# Patient Record
Sex: MALE | Race: WHITE | HISPANIC OR LATINO | Employment: FULL TIME | URBAN - METROPOLITAN AREA
[De-identification: names, ages, dates, MRNs, and addresses within clinical notes are randomized per-mention and may not be internally consistent; named-entity substitution may affect disease eponyms.]

---

## 2024-07-15 ENCOUNTER — APPOINTMENT (OUTPATIENT)
Dept: PRIMARY CARE | Facility: CLINIC | Age: 24
End: 2024-07-15

## 2024-07-15 ASSESSMENT — LIFESTYLE VARIABLES: HISTORY_OF_SMOKING: I HAVE NEVER SMOKED

## 2024-10-04 ENCOUNTER — TELEMEDICINE (OUTPATIENT)
Dept: PRIMARY CARE | Facility: CLINIC | Age: 24
End: 2024-10-04
Payer: COMMERCIAL

## 2024-10-04 DIAGNOSIS — J06.9 VIRAL URI: Primary | ICD-10-CM

## 2024-10-04 PROCEDURE — 99213 OFFICE O/P EST LOW 20 MIN: CPT | Performed by: NURSE PRACTITIONER

## 2024-10-04 RX ORDER — BROMPHENIRAMINE MALEATE, PSEUDOEPHEDRINE HYDROCHLORIDE, AND DEXTROMETHORPHAN HYDROBROMIDE 2; 30; 10 MG/5ML; MG/5ML; MG/5ML
5 SYRUP ORAL 4 TIMES DAILY PRN
Qty: 120 ML | Refills: 0 | Status: SHIPPED | OUTPATIENT
Start: 2024-10-04 | End: 2024-10-14

## 2024-10-04 ASSESSMENT — ENCOUNTER SYMPTOMS
VOMITING: 1
SORE THROAT: 1
SINUS PRESSURE: 1
NECK PAIN: 0
FATIGUE: 1
CHILLS: 0
COUGH: 0
DIAPHORESIS: 1
TROUBLE SWALLOWING: 0
APPETITE CHANGE: 0
HEADACHES: 1
DIARRHEA: 1
SINUS PAIN: 0
MYALGIAS: 0
DYSURIA: 0
ACTIVITY CHANGE: 0
DIZZINESS: 0
RHINORRHEA: 1
FEVER: 0
ABDOMINAL PAIN: 0
WHEEZING: 0
CHEST TIGHTNESS: 0
NAUSEA: 1
LIGHT-HEADEDNESS: 0

## 2024-10-04 NOTE — PROGRESS NOTES
Subjective   Patient ID: Vijay Antonio is a 24 y.o. male who presents for URI.    Sx onset: Tuesday  Sx include: sinus pressure, nasal congestion, nausea/vomiting, body aches, sweats, HA, sore throat.    COVID test was negative using OTC medication mucinex sinus relief,     URI   This is a new problem. The current episode started in the past 7 days. There has been no fever. Associated symptoms include congestion, diarrhea, headaches, nausea, rhinorrhea, sneezing, a sore throat and vomiting. Pertinent negatives include no abdominal pain, chest pain, coughing, dysuria, neck pain, plugged ear sensation, sinus pain or wheezing.        Review of Systems   Constitutional:  Positive for diaphoresis and fatigue. Negative for activity change, appetite change, chills and fever.   HENT:  Positive for congestion, rhinorrhea, sinus pressure, sneezing and sore throat. Negative for sinus pain and trouble swallowing.    Respiratory:  Negative for cough, chest tightness and wheezing.    Cardiovascular:  Negative for chest pain.   Gastrointestinal:  Positive for diarrhea, nausea and vomiting. Negative for abdominal pain.   Genitourinary:  Negative for dysuria.   Musculoskeletal:  Negative for myalgias and neck pain.   Neurological:  Positive for headaches. Negative for dizziness and light-headedness.       Objective   There were no vitals taken for this visit.    Physical Exam  Constitutional:       General: He is not in acute distress.     Appearance: Normal appearance. He is obese. He is not ill-appearing.      Comments: On Demand Virtual Visit Patient Consent     An interactive audio and video telecommunication system which permits real time communications between the patient (at the originating site) and provider (at the distant site) was utilized to provide this telehealth service.   Verbal consent was requested and obtained from Vijay Antonio (or parent if under 18) on this date, 03/27/24 for a telehealth  visit.   I have verbally confirmed with Vijay Antonio (or parent if under 18) that they are physically located in the [facility] during this virtual visit.    I performed this visit using realtime telehealth tools, including an audio/video OR telephone connection between the patient listed who was located in the STATE [facility] and myself, Skyler Morgan CNP (licensed in the [facility]).  At the start of the visit, I introduced myself as Skyler Morgan, Nurse practitioner and verified the patients name, , and current physical location.    If they were currently outside of the state of [place], the visit was ended and the patient was referred to alternative means for evaluation and treatment.   The patient was made aware of the limitations of the telehealth visit.  They will not be physically examined and all issues may not be appropriate for a telehealth visit.  If necessary, an in person referral will be made.       DISCLAIMER:   In preparing for this visit and writing this note, I reviewed previous electronic medical records (labs, imaging and medical charts) available.  Significant findings which helped in decision making are recorded in this encounter charting.    Telemedicine appropriate evaluation completed.  Unable to perform complete physical exam due to virtual visit, patient was visualized on interactive video.      Pulmonary:      Effort: Pulmonary effort is normal.   Neurological:      Mental Status: He is alert and oriented to person, place, and time.         Assessment/Plan   Diagnoses and all orders for this visit:  Viral URI  -     brompheniramine-pseudoeph-DM 2-30-10 mg/5 mL syrup; Take 5 mL by mouth 4 times a day as needed for congestion or cough for up to 10 days.  May take OTC robitussin or Mucinex as needed for cough, recommend warm liquids for sore throat, honey 1tsp three times daily may help cough, ad Tylenol as needed for pain/fever.  Rest and drink plenty of fluids     Most upper  respiratory infection are caused by viruses but sometimes they cause secondary bacterial infections. It can cause cough, congestion, runny nose, sore throat, and fever. You are contagious. Fever medicines can help reduce fever and pain. Virus cannot be cured by an antibiotic. The body's immune system will fight off the virus. Upper Respiratory Illnesses usually improves in 7 - 10 days, but coughs can last for several weeks. If your symptoms worsen after 10 - 14 days you may have a bacterial infection.

## 2024-10-04 NOTE — LETTER
October 4, 2024     Patient: Vijay Antonio   YOB: 2000   Date of Visit: 10/4/2024       To Whom It May Concern:    Vijay Antonio was seen in my clinic on 10/4/2024 at 2:40 pm. Please excuse Vijay for his absence from work on 10/03/2024 through 10/04/2024 due to illness.      If you have any questions or concerns, please don't hesitate to call.         Sincerely,         Skyler Morgan, APRN-CNP        CC: No Recipients

## 2024-12-01 ENCOUNTER — TELEMEDICINE (OUTPATIENT)
Dept: PRIMARY CARE | Facility: CLINIC | Age: 24
End: 2024-12-01
Payer: COMMERCIAL

## 2024-12-01 DIAGNOSIS — J06.9 VIRAL URI WITH COUGH: Primary | ICD-10-CM

## 2024-12-01 PROCEDURE — 99213 OFFICE O/P EST LOW 20 MIN: CPT | Performed by: NURSE PRACTITIONER

## 2024-12-01 RX ORDER — BROMPHENIRAMINE MALEATE, PSEUDOEPHEDRINE HYDROCHLORIDE, AND DEXTROMETHORPHAN HYDROBROMIDE 2; 30; 10 MG/5ML; MG/5ML; MG/5ML
5 SYRUP ORAL 4 TIMES DAILY PRN
Qty: 120 ML | Refills: 0 | Status: SHIPPED | OUTPATIENT
Start: 2024-12-01 | End: 2024-12-11

## 2024-12-01 ASSESSMENT — ENCOUNTER SYMPTOMS
CHILLS: 1
SHORTNESS OF BREATH: 0
ABDOMINAL PAIN: 0
FATIGUE: 1
SWOLLEN GLANDS: 0
NAUSEA: 0
SINUS PRESSURE: 1
COUGH: 1
VOMITING: 0
DIARRHEA: 1
ACTIVITY CHANGE: 0
FEVER: 1
DYSURIA: 0
SORE THROAT: 0
NECK PAIN: 0
CHEST TIGHTNESS: 0
DIZZINESS: 0
HEADACHES: 1
SINUS PAIN: 0
RHINORRHEA: 0
LIGHT-HEADEDNESS: 0
WHEEZING: 0
APPETITE CHANGE: 1
DIAPHORESIS: 0

## 2024-12-01 NOTE — LETTER
December 1, 2024     Patient: Vijay Antonio   YOB: 2000   Date of Visit: 12/1/2024       To Whom It May Concern:    Vijay Antonio was seen in my clinic on 12/1/2024 at 2:45 pm. Please excuse Vijay for his absence from work 11/30/2024 through 12/02/2024 due to illness.     If you have any questions or concerns, please don't hesitate to call.         Sincerely,         Skyler Morgan, APRN-CNP        CC: No Recipients

## 2024-12-01 NOTE — PROGRESS NOTES
Subjective   Patient ID: Vijay Antonio is a 24 y.o. male who presents for URI (Sx onset: 3 days ago).    Sx include: head cold sx, nasal/sinus congestion, cough, fever (101 t-max), full body aches, diarrhea  No flu test   COVID was negative yesterday  Denies wheezing, CP  OTC tx: cold medicine      URI   This is a new problem. The current episode started in the past 7 days. The problem has been waxing and waning. The maximum temperature recorded prior to his arrival was 101 - 101.9 F. The fever has been present for 1 to 2 days. Associated symptoms include congestion, coughing, diarrhea and headaches. Pertinent negatives include no abdominal pain, chest pain, dysuria, ear pain, joint pain, joint swelling, nausea, neck pain, plugged ear sensation, rash, rhinorrhea, sinus pain, sneezing, sore throat, swollen glands, vomiting or wheezing. He has tried antihistamine for the symptoms. The treatment provided mild relief.        Review of Systems   Constitutional:  Positive for appetite change, chills, fatigue and fever. Negative for activity change and diaphoresis.   HENT:  Positive for congestion, postnasal drip and sinus pressure. Negative for ear pain, rhinorrhea, sinus pain, sneezing and sore throat.    Respiratory:  Positive for cough. Negative for chest tightness, shortness of breath and wheezing.    Cardiovascular:  Negative for chest pain.   Gastrointestinal:  Positive for diarrhea. Negative for abdominal pain, nausea and vomiting.   Genitourinary:  Negative for dysuria.   Musculoskeletal:  Negative for joint pain and neck pain.   Skin:  Negative for rash.   Neurological:  Positive for headaches. Negative for dizziness and light-headedness.       Objective   There were no vitals taken for this visit.    Physical Exam  Constitutional:       General: He is not in acute distress.     Appearance: Normal appearance. He is not ill-appearing.      Comments: On Demand Virtual Visit Patient Consent     An  interactive audio and video telecommunication system which permits real time communications between the patient (at the originating site) and provider (at the distant site) was utilized to provide this telehealth service.   Verbal consent was requested and obtained from Vijay Antonio (or parent if under 18) on this date, 24 for a telehealth visit.   I have verbally confirmed with Vijay Antonio (or parent if under 18) that they are physically located in the [facility] during this virtual visit.    I performed this visit using realtime telehealth tools, including an audio/video OR telephone connection between the patient listed who was located in the [facility] and myself, Skyler Morgan CNP (licensed in the [facility]).  At the start of the visit, I introduced myself as Skyler Morgan Nurse practitioner and verified the patients name, , and current physical location.    If they were currently outside of the state [facility], the visit was ended and the patient was referred to alternative means for evaluation and treatment.   The patient was made aware of the limitations of the telehealth visit.  They will not be physically examined and all issues may not be appropriate for a telehealth visit.  If necessary, an in person referral will be made.       DISCLAIMER:   In preparing for this visit and writing this note, I reviewed previous electronic medical records (labs, imaging and medical charts) available.  Significant findings which helped in decision making are recorded in this encounter charting.     Pulmonary:      Effort: Pulmonary effort is normal.      Comments: Able to speak in full sentences without difficulty, cough not assess during video visit.     Neurological:      Mental Status: He is alert and oriented to person, place, and time.         Assessment/Plan   Diagnoses and all orders for this visit:  Viral URI with cough  -     brompheniramine-pseudoeph-DM 2-30-10 mg/5 mL syrup;  Take 5 mL by mouth 4 times a day as needed for congestion, cough or allergies for up to 10 days.  Most upper respiratory infection are caused by viruses but sometimes they cause secondary bacterial infections. It can cause cough, congestion, runny nose, sore throat, and fever. You are contagious. Fever medicines can help reduce fever and pain. Virus cannot be cured by an antibiotic. The body's immune system will fight off the virus. Upper Respiratory Illnesses usually improves in 7 - 10 days, but coughs can last for several weeks. If your symptoms worsen after 10 - 14 days you may have a bacterial infection.     Follow up with PCP as needed  Education provided in writing in MyChart

## 2024-12-27 ENCOUNTER — TELEMEDICINE (OUTPATIENT)
Dept: PRIMARY CARE | Facility: CLINIC | Age: 24
End: 2024-12-27
Payer: COMMERCIAL

## 2024-12-27 ENCOUNTER — APPOINTMENT (OUTPATIENT)
Dept: PRIMARY CARE | Facility: CLINIC | Age: 24
End: 2024-12-27
Payer: COMMERCIAL

## 2024-12-27 DIAGNOSIS — J06.9 URI, ACUTE: Primary | ICD-10-CM

## 2024-12-27 PROCEDURE — 99213 OFFICE O/P EST LOW 20 MIN: CPT

## 2024-12-27 ASSESSMENT — ENCOUNTER SYMPTOMS
VOMITING: 0
SORE THROAT: 0
WHEEZING: 0
RHINORRHEA: 1
HEADACHES: 1
NAUSEA: 0
COUGH: 1
ABDOMINAL PAIN: 0

## 2024-12-27 NOTE — LETTER
December 27, 2024     Patient: Vijay Antonio   YOB: 2000   Date of Visit: 12/27/2024       To Whom It May Concern:    Vijay Antonio was seen in my clinic on 12/27/2024 at 5:50 pm. Please excuse Vijay for his absence from work on this day to make the appointment. And from work 12/27/24 and 12/28/24 to recover from his viral illness    If you have any questions or concerns, please don't hesitate to call.         Sincerely,         Tiffanie Henderson, MARK-CNP        CC: No Recipients

## 2024-12-28 NOTE — PATIENT INSTRUCTIONS
Drinking plenty of fluids and getting lots of rest. Chicken soup and hot beverages may help.    Trial of nasal irrigation with a Nettipot or squeeze bottle with sterile salt water.    Nasal spray corticosteroids (Flonase) may help in reducing the inflammatory response in the nasal passages and airways. Please try 2 sprays each nostril daily for 2 weeks.  If you have season allergies, please take a daily antihistamine of your choice, such as Zyrtec/Claritin/Allegra at bedtime.    Take Tylenol or Motrin/Aleve for sinus or ear pain.    If you have good blood pressure you can try pseudofed (you must ask the pharmacist for it and show ID).    Please call or return to the office if you are not feeling better in the next 3-4 days after starting treatment.    Over-the-counter cold and cough medications     Take Mucinex for cough, drink plenty of fluids with this medication and will help break up congestion making it easier to cough up     For cough can use honey (children ages 1 and up) in hot tea or hot water. I recommend putting this in an insulated cup and carrying it around throughout the day to sip on.  Have it at your bedside at night in case you wake up coughing.  You can also use honey cough drops (adults and older children).     Recommend nasal saline for use in children and adults.  Neti Luu can also be helpful.  (Never used tap water and a Neti pot.  Use distilled water.)     If you have plugged up congested ears or the feeling of fluid in your ears, you can use an over-the-counter nasal steroid spray like fluticasone (brand name Flonase) use 2 sprays into each nostril once or twice a day for 7 days.  This will help open up the eustachian tubes and allow the fluid to drain out of your ears.     Sleeping with your head/chest elevated can help with sinus drainage.     Adults only-can use nasal decongestant (like Afrin) at bedtime to open nasal passages so you can breathe through your nose while you sleep; avoid  using for longer than 3 days as this medication can become addicting.  Do not use if you have high blood pressure or high heart rate.     For severe pain or fever in adult-Tylenol (2 extra strength) or ibuprofen (3-4 tabs equals 600 to 800 mg) alternating as needed for pain.  Tylenol doses should be 6 to 8 hours apart and ibuprofen doses should be 6 to 8 hours apart.        Common cold medications for adults explained:     Mucinex-(generic name guaifenesin)-is an expectorant.  This will thin out all the thick mucus.  Must drink plenty of liquids for this medicine to work.     Dextromethorphan (brand name Delsym or DM)-this medicine is a cough suppressant     Honey in hot water or tea-this is a natural cough suppressant     Decongestant nasal spray- (eg: Afrin) use for temporary relief of nasal congestion-best when used at bedtime to open up nasal passages so that you are not forced to mouth breathe overnight.     Sudafed (generic name pseudoephedrine-this must be bought from the pharmacist) DO NOT use this medicine if you have high blood pressure as it can raise your blood pressure higher.  Do not use if you have any irregular heart rate.  This medicine can help clear congestion in your sinuses.

## 2024-12-28 NOTE — PROGRESS NOTES
On Demand Virtual Visit Patient Consent     This visit was completed via video conference. All issues as below were discussed and addressed but no physical exam was performed. If it was felt that the patient should be evaluated in clinic than they were directed there. The patient verbally consented to the visit.    An interactive audio and video telecommunication system which permits real time communications between the patient (at the originating site) and provider (at the distant site) was utilized to provide this telehealth service.   Verbal consent was requested and obtained from Vijay Antonio (or parent if under 18) 12/27/24 for a telehealth visit.   I have verbally confirmed with Vijay Antonio (or parent if under 18) that they are physically located in the [facility] during this virtual visit.    Subjective   Patient ID: Vijay Antonio is a 24 y.o. male who presents for URI.  URI   This is a new problem. The current episode started yesterday. The problem has been gradually worsening. There has been no fever. Associated symptoms include congestion, coughing, headaches, joint pain, a plugged ear sensation and rhinorrhea. Pertinent negatives include no abdominal pain, ear pain, nausea, sore throat, vomiting or wheezing. He has tried nothing for the symptoms.       Review of Systems   HENT:  Positive for congestion and rhinorrhea. Negative for ear pain and sore throat.    Respiratory:  Positive for cough. Negative for wheezing.    Gastrointestinal:  Negative for abdominal pain, nausea and vomiting.   Musculoskeletal:  Positive for joint pain.   Neurological:  Positive for headaches.       Objective     There were no vitals taken for this visit.       Physical Exam  Constitutional:       General: He is not in acute distress.     Appearance: Normal appearance. He is not ill-appearing.   HENT:      Nose: Rhinorrhea present. No congestion.   Pulmonary:      Breath sounds: Normal breath  sounds.      Comments: Speaking in full length sentences, no cough during visit    Neurological:      Mental Status: He is alert.     Pt seen via video feed to be in no acute distress  Reviewed use of otc/supportive care and sent via pt instruct   Discussed options and precautions:   Viral versus bacterial infection; use of medications; possible side effects; appropriate over-the-counter medications; possible complications and /or when to follow-up.     Follow-up in 1 to 2 days if not improving.  Follow-up immediately if symptoms worsen.     All red flags requiring in person care were discussed.  All patient's questions were answered.  All questions were answered and need for follow-up/in person care was reviewed.        Assessment/Plan   Vijay was seen today for uri.  Diagnoses and all orders for this visit:  URI, acute

## 2024-12-30 ENCOUNTER — ANCILLARY PROCEDURE (OUTPATIENT)
Dept: URGENT CARE | Age: 24
End: 2024-12-30
Payer: COMMERCIAL

## 2024-12-30 ENCOUNTER — OFFICE VISIT (OUTPATIENT)
Dept: URGENT CARE | Age: 24
End: 2024-12-30
Payer: COMMERCIAL

## 2024-12-30 VITALS
WEIGHT: 300 LBS | TEMPERATURE: 97.1 F | BODY MASS INDEX: 40.63 KG/M2 | RESPIRATION RATE: 18 BRPM | SYSTOLIC BLOOD PRESSURE: 112 MMHG | HEIGHT: 72 IN | OXYGEN SATURATION: 97 % | DIASTOLIC BLOOD PRESSURE: 73 MMHG | HEART RATE: 128 BPM

## 2024-12-30 DIAGNOSIS — R05.1 ACUTE COUGH: ICD-10-CM

## 2024-12-30 DIAGNOSIS — R05.1 ACUTE COUGH: Primary | ICD-10-CM

## 2024-12-30 DIAGNOSIS — J10.1 INFLUENZA A: ICD-10-CM

## 2024-12-30 LAB
POC RAPID INFLUENZA A: POSITIVE
POC RAPID INFLUENZA B: NEGATIVE

## 2024-12-30 PROCEDURE — 1036F TOBACCO NON-USER: CPT | Performed by: FAMILY MEDICINE

## 2024-12-30 PROCEDURE — 87804 INFLUENZA ASSAY W/OPTIC: CPT | Performed by: FAMILY MEDICINE

## 2024-12-30 PROCEDURE — 71046 X-RAY EXAM CHEST 2 VIEWS: CPT | Performed by: FAMILY MEDICINE

## 2024-12-30 PROCEDURE — 3008F BODY MASS INDEX DOCD: CPT | Performed by: FAMILY MEDICINE

## 2024-12-30 PROCEDURE — 99214 OFFICE O/P EST MOD 30 MIN: CPT | Performed by: FAMILY MEDICINE

## 2024-12-30 RX ORDER — BROMPHENIRAMINE MALEATE, PSEUDOEPHEDRINE HYDROCHLORIDE, AND DEXTROMETHORPHAN HYDROBROMIDE 2; 30; 10 MG/5ML; MG/5ML; MG/5ML
10 SYRUP ORAL 4 TIMES DAILY PRN
Qty: 240 ML | Refills: 0 | Status: SHIPPED | OUTPATIENT
Start: 2024-12-30 | End: 2025-01-04

## 2024-12-30 ASSESSMENT — PATIENT HEALTH QUESTIONNAIRE - PHQ9
SUM OF ALL RESPONSES TO PHQ9 QUESTIONS 1 & 2: 0
1. LITTLE INTEREST OR PLEASURE IN DOING THINGS: NOT AT ALL
2. FEELING DOWN, DEPRESSED OR HOPELESS: NOT AT ALL

## 2024-12-30 ASSESSMENT — ENCOUNTER SYMPTOMS: COUGH: 1

## 2024-12-30 NOTE — PROGRESS NOTES
Subjective   Patient ID: Vijay Antonio is a 24 y.o. male. He presents today with a chief complaint of Cough (Started 3 days ago /Scratchy throat ) and Nasal Congestion (Dark brown phlegm /Chest congestion ).    History of Present Illness  Subjective  Vijay Antonio is a 24 y.o. male who presents for evaluation of symptoms of a URI. Symptoms include achiness, cough described as productive, fever, nasal congestion, and sore throat. Onset of symptoms was 4 days ago. He denies history of asthma.        Cough        Past Medical History  Allergies as of 12/30/2024 - Reviewed 12/30/2024   Allergen Reaction Noted    Cephalosporins Anaphylaxis and Unknown 01/22/2002    Penicillins Anaphylaxis and Unknown 12/10/2001       (Not in a hospital admission)       Past Medical History:   Diagnosis Date    Acute pharyngitis, unspecified 11/25/2014    Sore throat    Other conditions influencing health status     No significant past medical history       No past surgical history on file.     reports that he has never smoked. He has never been exposed to tobacco smoke. He has never used smokeless tobacco.    Review of Systems  Review of Systems   Respiratory:  Positive for cough.                                   Objective    Vitals:    12/30/24 1729   BP: 112/73   BP Location: Left arm   Patient Position: Sitting   Pulse: (!) 128   Resp: 18   Temp: 36.2 °C (97.1 °F)   SpO2: 97%   Weight: 136 kg (300 lb)   Height: 1.829 m (6')     No LMP for male patient.    Physical Exam  Constitutional:       Appearance: He is ill-appearing.   HENT:      Right Ear: Tympanic membrane, ear canal and external ear normal.      Left Ear: Tympanic membrane, ear canal and external ear normal.      Nose: Rhinorrhea present.      Mouth/Throat:      Mouth: Mucous membranes are moist.      Pharynx: Oropharynx is clear.   Eyes:      Extraocular Movements: Extraocular movements intact.      Pupils: Pupils are equal, round, and reactive to  light.   Cardiovascular:      Rate and Rhythm: Normal rate and regular rhythm.      Pulses: Normal pulses.      Heart sounds: Normal heart sounds.   Pulmonary:      Effort: Pulmonary effort is normal.      Breath sounds: No wheezing, rhonchi or rales.   Musculoskeletal:      Cervical back: Normal range of motion and neck supple. No rigidity or tenderness.   Lymphadenopathy:      Cervical: No cervical adenopathy.   Neurological:      Mental Status: He is alert.         Procedures    Point of Care Test & Imaging Results from this visit  No results found for this visit on 12/30/24.   No results found.    Diagnostic study results (if any) were reviewed by Yane Diaz MD.    Assessment/Plan   Allergies, medications, history, and pertinent labs/EKGs/Imaging reviewed by Yane Diaz MD.     Medical Decision Making      Orders and Diagnoses  There are no diagnoses linked to this encounter.    Medical Admin Record      Patient disposition: Home    Electronically signed by Yane Diaz MD  5:42 PM

## 2024-12-30 NOTE — LETTER
December 30, 2024     Patient: Vijay Antonio   YOB: 2000   Date of Visit: 12/30/2024       To Whom It May Concern:    Vijay Antonio was seen in my clinic on 12/30/2024 at 6:25 pm. Please excuse Vijay for his absence from work 12/29/24, 12/30/24 and 1/2/25.    If you have any questions or concerns, please don't hesitate to call.         Sincerely,         Yane Diaz MD        CC: No Recipients

## 2025-06-28 ENCOUNTER — E-VISIT (OUTPATIENT)
Dept: PRIMARY CARE | Facility: CLINIC | Age: 25
End: 2025-06-28
Payer: COMMERCIAL

## 2025-06-28 ENCOUNTER — TELEMEDICINE CLINICAL SUPPORT (OUTPATIENT)
Dept: PRIMARY CARE | Facility: CLINIC | Age: 25
End: 2025-06-28
Payer: COMMERCIAL

## 2025-06-28 DIAGNOSIS — J01.10 ACUTE NON-RECURRENT FRONTAL SINUSITIS: Primary | ICD-10-CM

## 2025-06-28 PROCEDURE — 99213 OFFICE O/P EST LOW 20 MIN: CPT

## 2025-06-28 RX ORDER — AZITHROMYCIN 500 MG/1
500 TABLET, FILM COATED ORAL DAILY
Qty: 5 TABLET | Refills: 0 | Status: SHIPPED | OUTPATIENT
Start: 2025-06-28 | End: 2025-07-03

## 2025-06-28 ASSESSMENT — LIFESTYLE VARIABLES: HISTORY_OF_SMOKING: I HAVE NEVER SMOKED

## 2025-06-28 NOTE — PROGRESS NOTES
Subjective   Patient ID: Vijay Antonio is a 24 y.o. male who presents for No chief complaint on file..    HPI   Pt seen via tel visit for sinus pressure and congestion. Started over a week ago. Now coughing and having chills. Just taking tylenol for the discomfort. Does have nausea and some vomiting due to the mucus. No shortness of breath.     Review of Systems    Objective   There were no vitals taken for this visit.    Physical Exam  Constitutional:       Appearance: Normal appearance. He is normal weight.   HENT:      Right Ear: Tympanic membrane normal. There is no impacted cerumen.      Left Ear: Tympanic membrane normal. There is no impacted cerumen.      Nose: Congestion present.      Mouth/Throat:      Mouth: Mucous membranes are moist.   Eyes:      Conjunctiva/sclera: Conjunctivae normal.   Cardiovascular:      Rate and Rhythm: Normal rate.      Pulses: Normal pulses.      Heart sounds: Normal heart sounds.   Pulmonary:      Effort: Pulmonary effort is normal.   Abdominal:      General: Abdomen is flat. Bowel sounds are normal.      Palpations: Abdomen is soft.   Musculoskeletal:         General: Normal range of motion.   Skin:     General: Skin is warm and dry.      Capillary Refill: Capillary refill takes less than 2 seconds.   Neurological:      General: No focal deficit present.      Mental Status: He is alert.   Psychiatric:         Mood and Affect: Mood normal.         Assessment/Plan   Diagnoses and all orders for this visit:  Acute non-recurrent frontal sinusitis  -     azithromycin (Zithromax) 500 mg tablet; Take 1 tablet (500 mg) by mouth once daily for 5 days.         Virtual Visit Patient Consent     This visit was completed via video conference. All issues as below were discussed and addressed but no physical exam was performed. If it was felt that the patient should be evaluated in clinic than they were directed there. The patient verbally consented to the visit.    An interactive  audio and video telecommunication system which permits real time communications between the patient (at the originating site) and provider (at the distant site) was utilized to provide this telehealth service.   Verbal consent was requested and obtained from Vijay Antonio (or parent if under 18) 06/28/25 for a telehealth visit.   I have verbally confirmed with Vijay Antonio (or parent if under 18) that they are physically located in the [facility] during this virtual visit.

## 2025-06-28 NOTE — LETTER
June 28, 2025     Patient: Vijay Antonio   YOB: 2000   Date of Visit: 6/28/2025       To Whom It May Concern:    Vijay Antonio was seen in my clinic on 6/28/2025 at 11:45 am. Please excuse Vijay for his absence from work on this day to make the appointment. Please excuse him from work 6/28/2025 and 6/29/2025.    If you have any questions or concerns, please don't hesitate to call.         Sincerely,         Next Available Video Visit Provider        CC: No Recipients